# Patient Record
Sex: MALE | Race: OTHER | Employment: FULL TIME | URBAN - METROPOLITAN AREA
[De-identification: names, ages, dates, MRNs, and addresses within clinical notes are randomized per-mention and may not be internally consistent; named-entity substitution may affect disease eponyms.]

---

## 2025-04-23 ENCOUNTER — TELEPHONE (OUTPATIENT)
Dept: CARDIOLOGY | Facility: HOSPITAL | Age: 40
End: 2025-04-23
Payer: COMMERCIAL

## 2025-04-23 NOTE — TELEPHONE ENCOUNTER
PT called to schedule NPV with Dr. Portillo - pt states Dr. Hawkins with [facility] referring to estab w/ cardiologist. PT agreeable to appt with Dr. Portillo 6/12 2:20pm [facility]. Dr. Hawkins's office faxing referral to us today.

## 2025-05-06 PROBLEM — R07.9 CHEST PAIN: Status: ACTIVE | Noted: 2025-05-06

## 2025-05-07 ENCOUNTER — OFFICE VISIT (OUTPATIENT)
Dept: CARDIOLOGY | Facility: CLINIC | Age: 40
End: 2025-05-07
Payer: COMMERCIAL

## 2025-05-07 VITALS
BODY MASS INDEX: 29.71 KG/M2 | WEIGHT: 174 LBS | DIASTOLIC BLOOD PRESSURE: 66 MMHG | HEART RATE: 71 BPM | HEIGHT: 64 IN | SYSTOLIC BLOOD PRESSURE: 112 MMHG

## 2025-05-07 DIAGNOSIS — R07.9 CHEST PAIN, UNSPECIFIED TYPE: Primary | ICD-10-CM

## 2025-05-07 PROCEDURE — 99202 OFFICE O/P NEW SF 15 MIN: CPT

## 2025-05-07 PROCEDURE — 1036F TOBACCO NON-USER: CPT | Performed by: INTERNAL MEDICINE

## 2025-05-07 PROCEDURE — 99202 OFFICE O/P NEW SF 15 MIN: CPT | Performed by: INTERNAL MEDICINE

## 2025-05-07 PROCEDURE — 3008F BODY MASS INDEX DOCD: CPT | Performed by: INTERNAL MEDICINE

## 2025-05-07 PROCEDURE — 99204 OFFICE O/P NEW MOD 45 MIN: CPT | Performed by: INTERNAL MEDICINE

## 2025-05-07 RX ORDER — BUSPIRONE HYDROCHLORIDE 5 MG/1
TABLET ORAL
COMMUNITY
Start: 2025-05-01

## 2025-05-07 RX ORDER — BUPROPION HYDROCHLORIDE 300 MG/1
TABLET ORAL
COMMUNITY
Start: 2025-05-01

## 2025-05-07 RX ORDER — FLUTICASONE PROPIONATE 50 MCG
SPRAY, SUSPENSION (ML) NASAL
COMMUNITY
Start: 2024-12-04

## 2025-05-07 RX ORDER — LORATADINE 10 MG/1
10 TABLET ORAL DAILY
COMMUNITY

## 2025-05-07 RX ORDER — ASPIRIN 81 MG/1
TABLET ORAL
COMMUNITY
Start: 2024-09-19

## 2025-05-07 NOTE — PROGRESS NOTES
"Chief Complaint:   Chest Pain     History of Present Illness     Nick Shelley \"Abbie" is a 39 y.o. male presenting with chest pain for cardiac consultation.  The patient complains that for the past month (1) , they have experienced  non-radiating precordial chest pressure that lasted 5 seconds episodes.  The discomfort is exacerbated by none and relieved by Buspar.  The discomfort is getting better.  The patient does not experience associated shortness of breath, nausea, vomiting or diaphoresis.  The pain is not positional and is not reproduced by palpation.  The patient has no history of known coronary artery disease.  The patient has not had a stress test within the last 12 months and has never had cardiac catheterization.  The patient exercises and does not experience chest discomfort with exertion.  There is no history of aortic aneurysm or thromboembolism.  The patient denies any systemic complaints including fever.  Had palpitations x 6 months - skipped beat and has relieved with Buspar. No risk factors for CAD.  Adopted. Non-smoker.  No HX HTN, DM, HPL.    Review of Systems  All pertinent systems have been reviewed and are negative except for what is stated in the history of present illness.    All other systems have been reviewed and are negative and noncontributory to this patient's current ailments.   .       Previous History     Past Medical History:  He has a past medical history of Chest pain (05/06/2025) and Unspecified injury of right lower leg, initial encounter.    Past Surgical History:  He has a past surgical history that includes Other surgical history (02/08/2021) and Other surgical history (02/08/2021).      Social History:  He reports that he has never smoked. He has never used smokeless tobacco. No history on file for alcohol use and drug use.    Family History:  Family History[1]     Allergies:  Shellfish containing products    Outpatient Medications:  Current Outpatient Medications " "  Medication Instructions    aspirin 81 mg tablet     buPROPion XL (Wellbutrin XL) 300 mg 24 hr tablet     busPIRone (Buspar) 5 mg tablet     fluticasone (Flonase Allergy Relief) 50 mcg/actuation nasal spray     loratadine (CLARITIN) 10 mg, oral, Daily       Physical Examination   Vitals:  Visit Vitals  /66 (BP Location: Right arm, Patient Position: Sitting, BP Cuff Size: Adult)   Pulse 71   Ht 1.626 m (5' 4\")   Wt 78.9 kg (174 lb)   BMI 29.87 kg/m²   Smoking Status Never   BSA 1.89 m²    Physical Exam  Vitals reviewed.   Constitutional:       General: He is not in acute distress.     Appearance: Normal appearance.   HENT:      Head: Normocephalic and atraumatic.      Nose: Nose normal.   Eyes:      Conjunctiva/sclera: Conjunctivae normal.   Cardiovascular:      Rate and Rhythm: Normal rate and regular rhythm.      Pulses: Normal pulses.      Heart sounds: No murmur heard.  Pulmonary:      Effort: Pulmonary effort is normal. No respiratory distress.      Breath sounds: Normal breath sounds. No wheezing, rhonchi or rales.   Abdominal:      General: Bowel sounds are normal. There is no distension.      Palpations: Abdomen is soft.      Tenderness: There is no abdominal tenderness.   Musculoskeletal:         General: No swelling.      Right lower leg: No edema.      Left lower leg: No edema.   Skin:     General: Skin is warm and dry.      Capillary Refill: Capillary refill takes less than 2 seconds.   Neurological:      General: No focal deficit present.      Mental Status: He is alert.   Psychiatric:         Mood and Affect: Mood normal.             Labs/Imaging/Cardiac Studies   I have personally reviewed the patient's available lab work, primary care appointment notes, pertinent imaging studies, and cardiac studies and have discussed them and my independent interpretation of those results with the patient and caregiver at this appointment.  All pertinent recent Emergency Department evaluations and Hospital " admissions were also reviewed in detail with the patient and caregiver.    Reviewed ECG and PCP notes    Echo:  No echocardiogram results found for the past 12 months       Assessment and Recommendations     Assessment/Plan       1. Chest pain, unspecified type (Primary)  The patient presents with atypical chest pain. The ECG is non-ischemic.  The differential diagnosis includes CAD, gastrointestinal, pulmonary, and musculoskeletal etiologies.  There is no clinical evidence to suggest acute coronary syndrome, aortic dissection, or pulmonary embolism.  The ECG shows no evidence of ischemia.  An outpatient evaluation of the patient's chest pain is appropriate with a stress test which will be scheduled as soon as can be arranged. For severe and/or prolonged chest pain, the patient was instructed to call 911.     - Echocardiogram Stress Test; Future           Francisco Goldberg MD    Exclusive of any other services or procedures performed, I, Francisco Goldberg MD , spent 30 minutes in duration for this visit today.  This time consisted of chart review, obtaining history, and/or performing the exam as documented above as well as documenting the clinical information for the encounter in the electronic record, discussing treatment options, plans, and/or goals with patient, family, and/or caregiver, refilling medications, updating the electronic record, ordering medicines, lab work, imaging, referrals, and/or procedures as documented above and communicating with other [facility] professionals. I have discussed the results of laboratory, radiology, and cardiology studies with the patient and their family/caregiver.           [1]   Family History  Adopted: Yes

## 2025-05-15 ENCOUNTER — TELEPHONE (OUTPATIENT)
Dept: CARDIOLOGY | Facility: CLINIC | Age: 40
End: 2025-05-15
Payer: COMMERCIAL

## 2025-05-20 ENCOUNTER — HOSPITAL ENCOUNTER (OUTPATIENT)
Dept: CARDIOLOGY | Facility: CLINIC | Age: 40
Discharge: HOME | End: 2025-05-20
Payer: COMMERCIAL

## 2025-05-20 ENCOUNTER — APPOINTMENT (OUTPATIENT)
Dept: CARDIOLOGY | Facility: CLINIC | Age: 40
End: 2025-05-20
Payer: COMMERCIAL

## 2025-05-20 VITALS
SYSTOLIC BLOOD PRESSURE: 142 MMHG | HEIGHT: 64 IN | BODY MASS INDEX: 29.71 KG/M2 | DIASTOLIC BLOOD PRESSURE: 83 MMHG | HEART RATE: 73 BPM | WEIGHT: 174 LBS

## 2025-05-20 VITALS
SYSTOLIC BLOOD PRESSURE: 142 MMHG | BODY MASS INDEX: 29.71 KG/M2 | HEART RATE: 73 BPM | HEIGHT: 64 IN | WEIGHT: 174 LBS | DIASTOLIC BLOOD PRESSURE: 82 MMHG

## 2025-05-20 DIAGNOSIS — R07.9 CHEST PAIN, UNSPECIFIED TYPE: ICD-10-CM

## 2025-05-20 DIAGNOSIS — R07.9 CHEST PAIN, UNSPECIFIED TYPE: Primary | ICD-10-CM

## 2025-05-20 DIAGNOSIS — I42.1 OBSTRUCTIVE HYPERTROPHIC CARDIOMYOPATHY (MULTI): ICD-10-CM

## 2025-05-20 PROCEDURE — 93017 CV STRESS TEST TRACING ONLY: CPT

## 2025-05-20 PROCEDURE — 99212 OFFICE O/P EST SF 10 MIN: CPT | Mod: 25

## 2025-05-20 PROCEDURE — 99214 OFFICE O/P EST MOD 30 MIN: CPT | Performed by: INTERNAL MEDICINE

## 2025-05-20 PROCEDURE — 99212 OFFICE O/P EST SF 10 MIN: CPT | Performed by: INTERNAL MEDICINE

## 2025-05-20 PROCEDURE — 93350 STRESS TTE ONLY: CPT | Performed by: INTERNAL MEDICINE

## 2025-05-20 PROCEDURE — 93018 CV STRESS TEST I&R ONLY: CPT | Performed by: INTERNAL MEDICINE

## 2025-05-20 PROCEDURE — 1036F TOBACCO NON-USER: CPT | Performed by: INTERNAL MEDICINE

## 2025-05-20 PROCEDURE — 3008F BODY MASS INDEX DOCD: CPT | Performed by: INTERNAL MEDICINE

## 2025-05-20 PROCEDURE — 93016 CV STRESS TEST SUPVJ ONLY: CPT | Performed by: INTERNAL MEDICINE

## 2025-05-20 NOTE — PROGRESS NOTES
"Chief Complaint:   Chest Pain     History of Present Illness     Nick Shelley \"Abbie" is a 39 y.o. male presenting with chest pain for cardiac consultation.  The patient complains that for the past month (1) , they have experienced  non-radiating precordial chest pressure that lasted 5 seconds episodes.  The discomfort is exacerbated by none and relieved by Buspar.  The discomfort is getting better.  The patient does not experience associated shortness of breath, nausea, vomiting or diaphoresis.  The pain is not positional and is not reproduced by palpation.  The patient has no history of known coronary artery disease.  The patient has not had a stress test within the last 12 months and has never had cardiac catheterization.  The patient exercises and does not experience chest discomfort with exertion.  There is no history of aortic aneurysm or thromboembolism.  The patient denies any systemic complaints including fever.  Had palpitations x 6 months - skipped beat and has relieved with Buspar. No risk factors for CAD.  Adopted. Non-smoker.  No HX HTN, DM, HPL.  Since here 5/7/25, no further CP and exercising.    Review of Systems  All pertinent systems have been reviewed and are negative except for what is stated in the history of present illness.    All other systems have been reviewed and are negative and noncontributory to this patient's current ailments.   .       Previous History     Past Medical History:  He has a past medical history of Chest pain (05/06/2025) and Unspecified injury of right lower leg, initial encounter.    Past Surgical History:  He has a past surgical history that includes Other surgical history (02/08/2021) and Other surgical history (02/08/2021).      Social History:  He reports that he has never smoked. He has never used smokeless tobacco. No history on file for alcohol use and drug use.    Family History:  Family History[1]     Allergies:  Shellfish containing products    Outpatient " "Medications:  Current Outpatient Medications   Medication Instructions    aspirin 81 mg tablet     buPROPion XL (Wellbutrin XL) 300 mg 24 hr tablet     busPIRone (Buspar) 5 mg tablet     fluticasone (Flonase Allergy Relief) 50 mcg/actuation nasal spray     loratadine (CLARITIN) 10 mg, Daily       Physical Examination   Vitals:  Visit Vitals  /82 (BP Location: Right arm)   Pulse 73   Ht 1.626 m (5' 4\")   Wt 78.9 kg (174 lb)   BMI 29.87 kg/m²   Smoking Status Never   BSA 1.89 m²    Physical Exam  Vitals reviewed.   Constitutional:       General: He is not in acute distress.     Appearance: Normal appearance.   HENT:      Head: Normocephalic and atraumatic.      Nose: Nose normal.   Eyes:      Conjunctiva/sclera: Conjunctivae normal.   Cardiovascular:      Rate and Rhythm: Normal rate and regular rhythm.      Pulses: Normal pulses.      Heart sounds: No murmur heard.  Pulmonary:      Effort: Pulmonary effort is normal. No respiratory distress.      Breath sounds: Normal breath sounds. No wheezing, rhonchi or rales.   Abdominal:      General: Bowel sounds are normal. There is no distension.      Palpations: Abdomen is soft.      Tenderness: There is no abdominal tenderness.   Musculoskeletal:         General: No swelling.      Right lower leg: No edema.      Left lower leg: No edema.   Skin:     General: Skin is warm and dry.      Capillary Refill: Capillary refill takes less than 2 seconds.   Neurological:      General: No focal deficit present.      Mental Status: He is alert.   Psychiatric:         Mood and Affect: Mood normal.             Labs/Imaging/Cardiac Studies   I have personally reviewed the patient's available lab work, primary care appointment notes, pertinent imaging studies, and cardiac studies and have discussed them and my independent interpretation of those results with the patient and caregiver at this appointment.  All pertinent recent Emergency Department evaluations and Hospital admissions " were also reviewed in detail with the patient and caregiver.    Reviewed Stress echo - no ischemia but septal hypertrophy and moderate mid-cavity obstruction with exercise    Echo:  No echocardiogram results found for the past 12 months       Assessment and Recommendations     Assessment/Plan       1. Chest pain, unspecified type (Primary)  The patient presents with atypical chest pain which has resolved and a normal stress echo.  The chest pain is likely non-cardiac.   2. Hypertrophic cardiomyopathy  Not clear this was the cause of his CP and may be an incidental finding.  No dyspnea, palpitations or syncope.  Adopted so FH unknown.  Cardiac MRI.           Francisco Goldberg MD    Exclusive of any other services or procedures performed, I, Francisco Goldberg MD , spent 30 minutes in duration for this visit today.  This time consisted of chart review, obtaining history, and/or performing the exam as documented above as well as documenting the clinical information for the encounter in the electronic record, discussing treatment options, plans, and/or goals with patient, family, and/or caregiver, refilling medications, updating the electronic record, ordering medicines, lab work, imaging, referrals, and/or procedures as documented above and communicating with other [facility] professionals. I have discussed the results of laboratory, radiology, and cardiology studies with the patient and their family/caregiver.           [1]   Family History  Adopted: Yes

## 2025-06-06 ENCOUNTER — HOSPITAL ENCOUNTER (OUTPATIENT)
Dept: RADIOLOGY | Facility: HOSPITAL | Age: 40
Discharge: HOME | End: 2025-06-06
Payer: COMMERCIAL

## 2025-06-06 VITALS — WEIGHT: 171.96 LBS | BODY MASS INDEX: 29.36 KG/M2 | HEIGHT: 64 IN

## 2025-06-06 DIAGNOSIS — I42.1 OBSTRUCTIVE HYPERTROPHIC CARDIOMYOPATHY (MULTI): ICD-10-CM

## 2025-06-06 DIAGNOSIS — R07.9 CHEST PAIN, UNSPECIFIED TYPE: ICD-10-CM

## 2025-06-06 PROCEDURE — 2550000001 HC RX 255 CONTRASTS: Performed by: INTERNAL MEDICINE

## 2025-06-06 PROCEDURE — A9575 INJ GADOTERATE MEGLUMI 0.1ML: HCPCS | Performed by: INTERNAL MEDICINE

## 2025-06-06 PROCEDURE — 75561 CARDIAC MRI FOR MORPH W/DYE: CPT

## 2025-06-06 RX ORDER — GADOTERATE MEGLUMINE 376.9 MG/ML
31 INJECTION INTRAVENOUS
Status: COMPLETED | OUTPATIENT
Start: 2025-06-06 | End: 2025-06-06

## 2025-06-06 RX ADMIN — GADOTERATE MEGLUMINE 31 ML: 376.9 INJECTION INTRAVENOUS at 14:43

## 2025-06-09 ENCOUNTER — TELEPHONE (OUTPATIENT)
Dept: CARDIOLOGY | Facility: CLINIC | Age: 40
End: 2025-06-09
Payer: COMMERCIAL

## 2025-06-09 DIAGNOSIS — I42.1 OBSTRUCTIVE HYPERTROPHIC CARDIOMYOPATHY (MULTI): Primary | ICD-10-CM

## 2025-06-09 NOTE — TELEPHONE ENCOUNTER
----- Message from Francisco Goldberg sent at 6/9/2025  7:36 AM EDT -----  Cardiac MRI - mild septal hypertrophy.  Consult Dr. Cody for his abnormal stress echo suggesting mild hypertrophic cardiomypathy  ----- Message -----  From: Interface, Radiology Results In  Sent: 6/6/2025   3:17 PM EDT  To: Francisco Goldberg MD

## 2025-06-12 ENCOUNTER — APPOINTMENT (OUTPATIENT)
Dept: CARDIOLOGY | Facility: HOSPITAL | Age: 40
End: 2025-06-12
Payer: COMMERCIAL

## 2025-06-23 ENCOUNTER — TELEPHONE (OUTPATIENT)
Dept: CARDIOLOGY | Facility: CLINIC | Age: 40
End: 2025-06-23
Payer: COMMERCIAL

## 2025-06-25 ENCOUNTER — TELEPHONE (OUTPATIENT)
Dept: CARDIOLOGY | Facility: HOSPITAL | Age: 40
End: 2025-06-25
Payer: COMMERCIAL

## 2025-06-25 DIAGNOSIS — I42.1 OBSTRUCTIVE HYPERTROPHIC CARDIOMYOPATHY (MULTI): Primary | ICD-10-CM

## 2025-07-14 ENCOUNTER — TELEPHONE (OUTPATIENT)
Dept: CARDIOLOGY | Facility: HOSPITAL | Age: 40
End: 2025-07-14
Payer: COMMERCIAL

## 2025-07-14 NOTE — TELEPHONE ENCOUNTER
Left a message for patient about 7-15-25 echo at [facility] and 7-17-25 appointment with Dr. Morse at [facility]

## 2025-07-15 ENCOUNTER — HOSPITAL ENCOUNTER (OUTPATIENT)
Dept: CARDIOLOGY | Facility: HOSPITAL | Age: 40
Discharge: HOME | End: 2025-07-15
Payer: COMMERCIAL

## 2025-07-15 DIAGNOSIS — I42.1 OBSTRUCTIVE HYPERTROPHIC CARDIOMYOPATHY (MULTI): ICD-10-CM

## 2025-07-15 PROCEDURE — 2500000004 HC RX 250 GENERAL PHARMACY W/ HCPCS (ALT 636 FOR OP/ED)

## 2025-07-15 PROCEDURE — 93306 TTE W/DOPPLER COMPLETE: CPT | Performed by: STUDENT IN AN ORGANIZED HEALTH CARE EDUCATION/TRAINING PROGRAM

## 2025-07-15 PROCEDURE — 93306 TTE W/DOPPLER COMPLETE: CPT

## 2025-07-15 RX ADMIN — HUMAN ALBUMIN MICROSPHERES AND PERFLUTREN 3 ML: 10; .22 INJECTION, SOLUTION INTRAVENOUS at 11:50

## 2025-07-16 PROBLEM — S83.249A MEDIAL MENISCUS TEAR: Status: ACTIVE | Noted: 2017-10-23

## 2025-07-16 LAB
AORTIC VALVE MEAN GRADIENT: 6 MMHG
AORTIC VALVE PEAK VELOCITY: 1.56 M/S
AV PEAK GRADIENT: 10 MMHG
AVA (PEAK VEL): 2.62 CM2
AVA (VTI): 2.69 CM2
EJECTION FRACTION APICAL 4 CHAMBER: 66.8
EJECTION FRACTION: 69 %
LEFT ATRIUM VOLUME AREA LENGTH INDEX BSA: 30 ML/M2
LEFT VENTRICLE INTERNAL DIMENSION DIASTOLE: 4.65 CM (ref 3.5–6)
LEFT VENTRICULAR OUTFLOW TRACT DIAMETER: 1.97 CM
MITRAL VALVE E/A RATIO: 0.84
RIGHT VENTRICLE FREE WALL PEAK S': 9 CM/S
RIGHT VENTRICLE PEAK SYSTOLIC PRESSURE: 29 MMHG
TRICUSPID ANNULAR PLANE SYSTOLIC EXCURSION: 1.7 CM

## 2025-07-16 NOTE — PROGRESS NOTES
[facility]   Hypertrophic Cardiomyopathy Center    Primary Care Physician: Renetta Diaz MD  Primary Cardiologist:  Dr. Francisco Goldberg  Date of Visit: 2025  1:40 PM EDT     HPI / Summary:   Nick Sehlley is a 39 y.o. male who is referred for evaluation of possible HCM.     He initially saw Dr. Goldbreg for chest pain. The chest pain was random and not associated with exercise. Not typical for angina but he did notice a number of irregular heart beats. An echocardiogram was suggestive of HCM and thus a CMR was done. CMR suggested maximal wall thickness of 1.3 cm (as did the echo). The pressure in his chest is gone and palpitations are not there anymore. Doesn't have a diagnosis of HTN, occasionally has BP in the 140s.     He was adopted and does not know anything about his family history aside from that his birth mother  in her 30s. One of his cats has HCM. He has never had syncope. He does not have siblings or children.     ROS: Relevant review of symptoms of negative unless discussed above.     Problems:   Problem List[1]    Medical History:   Medical History[2]    Surgical Hx:   Surgical History[3]     Family Hx:   Family History[4]     Social Hx:  Fun: golf, video games  Occupation/School: sociology/ at [employer]  Current Outpatient Medications   Medication Instructions    aspirin 81 mg tablet     buPROPion XL (Wellbutrin XL) 300 mg 24 hr tablet Take 1 tablet (300 mg) by mouth once daily in the morning.    buPROPion XL (WELLBUTRIN XL) 150 mg, oral, Every morning    busPIRone (Buspar) 5 mg tablet Take 1 tablet (5 mg) by mouth 2 times a day.    fluticasone (Flonase Allergy Relief) 50 mcg/actuation nasal spray     loratadine (CLARITIN) 10 mg, As needed       Allergies  Shellfish containing products    Exam:   Vitals: /82 (BP Location: Right arm, Patient Position: Sitting, BP Cuff Size: Large adult)   Pulse 79    "Ht 1.626 m (5' 4\")   Wt 78.5 kg (173 lb)   SpO2 96%   BMI 29.70 kg/m²   Wt Readings from Last 5 Encounters:   07/17/25 78.5 kg (173 lb)   06/06/25 78 kg (171 lb 15.3 oz)   05/20/25 78.9 kg (174 lb)   05/20/25 78.9 kg (174 lb)   05/07/25 78.9 kg (174 lb)     GEN: Pleasant, well-appearing, no acute distress.  HEENT: JVP not elevated  CHEST: Clear to auscultation, No wheeze, good air movement.  CV: normal rate, regular rhythm, no murmurs at rest, with valsalva or with squat to stand.   EXT: Warm, well perfused, No LE edema.   NEURO: grossly non focal  SKIN: No obvious rashes     Labs:   Lipids  Lab Results   Component Value Date    CHOL 181 11/10/2021     Lab Results   Component Value Date    HDL 48.7 11/10/2021     No results found for: \"LDLCALC\"  Lab Results   Component Value Date    TRIG 112 11/10/2021     No components found for: \"CHOLHDL\"    Hemoglobin A1C  Lab Results   Component Value Date    HGBA1C 4.8 11/10/2021       BMP  Lab Results   Component Value Date    GLUCOSE 93 10/13/2021    CALCIUM 9.9 10/13/2021     10/13/2021    K 4.1 10/13/2021    CO2 29 10/13/2021     10/13/2021    BUN 7 10/13/2021    CREATININE 0.95 10/13/2021         Notable Studies: imaging personally reviewed and summarized by me below  EKG:  -7/17/25: NSR, TWI V3-5    Echo:  -7/15/2025 personal review, normal LVEF 65-70%, no ROLAN or LVOT obstruction, max IVS 1.3 cm in short axis    Cardiac MRI:  - 6/6/2025: LVEF 57%, minimal asymmetric septal hypertrophy of the basal anteroseptal wall measuring up to 1.3 cm, no evidence of ROLAN, no LGE, mild TR, mild MR, no unusual papillary muscle insertion    Stress Test:  - 5/20/2025 stress echocardiogram, stage IV of the Berny protocol 10 minutes 44 seconds, 12.9 METS, peak heart rate 164 bpm which is 90% of age-predicted heart rate, no significant symptoms, no arrhythmias or ischemic changes with exercise.  Resting LVEF 55 to 60% with increase with exercise to 70 to 75%.  Reviewed the 44 " mmHg of mid cavitary obstruction with exercise.      Assessment and Plan  Nick Shelley is a 39 y.o. male who is referred for evaluation of possible HCM.     I reviewed his cardiac MRI, echocardiogram and stress echocardiogram.  His echocardiogram shows a maximal septal wall thickness of 1.3 cm (best seen in the short axis) in the maximal wall thickness seen on his cardiac MRI is 1.3 cm as well.  He has no LGE or other morphologic features suggestive of HCM.  His stress echocardiogram showed a hyperdynamic left ventricle with exercise and the gradient is 44 mmHg and mid cavitary due to the dynamic LV.      At this point, he does not meet criteria for hypertrophic cardiomyopathy by wall thickness measurement but his ECG shows TWI in V3-5 and his birth mother (he is adopted) reportedly  in her 30s, which is suspicious. Aside from his cat who has HCM, he has no known family members with HCM.   -will do genetic testing. If he has a pathogenic mutation, then the classification would change.   -regardless, will do a repeat echocardiogram in one year.     Follow up in one year for HCM eval. Can follow continuously with Dr. Goldberg for general cardiology.     Avery Morse MD  Director, Sports Cardiology  Hy[facility]omy[facility]       Time Spent: I spent 40 minutes reviewing medical testing, talking with CMR specialist, obtaining medical history and counselling and educating on diagnosis and documenting clinical encounter.          [1]   Patient Active Problem List  Diagnosis    Chest pain    Obstructive hypertrophic cardiomyopathy (Multi)    Medial meniscus tear   [2]   Past Medical History:  Diagnosis Date    Abnormal ECG     Arrhythmia     Atrial fibrillation (Multi)     Chest pain 2025    Obstructive hypertrophic cardiomyopathy (Multi) 2025    Unspecified injury of right lower leg, initial encounter     Right knee injury   [3]   Past Surgical History:  Procedure Laterality Date     OTHER SURGICAL HISTORY  02/08/2021    Knee surgery    OTHER SURGICAL HISTORY  02/08/2021    Tonsillectomy   [4]   Family History  Adopted: Yes

## 2025-07-17 ENCOUNTER — TELEPHONE (OUTPATIENT)
Dept: GENETICS | Facility: CLINIC | Age: 40
End: 2025-07-17

## 2025-07-17 ENCOUNTER — OFFICE VISIT (OUTPATIENT)
Dept: CARDIOLOGY | Facility: CLINIC | Age: 40
End: 2025-07-17
Payer: COMMERCIAL

## 2025-07-17 VITALS
HEIGHT: 64 IN | BODY MASS INDEX: 29.53 KG/M2 | OXYGEN SATURATION: 96 % | WEIGHT: 173 LBS | HEART RATE: 79 BPM | DIASTOLIC BLOOD PRESSURE: 82 MMHG | SYSTOLIC BLOOD PRESSURE: 134 MMHG

## 2025-07-17 DIAGNOSIS — I51.7 LVH (LEFT VENTRICULAR HYPERTROPHY): ICD-10-CM

## 2025-07-17 DIAGNOSIS — I42.1 OBSTRUCTIVE HYPERTROPHIC CARDIOMYOPATHY (MULTI): Primary | ICD-10-CM

## 2025-07-17 PROCEDURE — 99212 OFFICE O/P EST SF 10 MIN: CPT

## 2025-07-17 PROCEDURE — 93005 ELECTROCARDIOGRAM TRACING: CPT | Performed by: INTERNAL MEDICINE

## 2025-07-17 PROCEDURE — 3008F BODY MASS INDEX DOCD: CPT | Performed by: INTERNAL MEDICINE

## 2025-07-17 PROCEDURE — 99204 OFFICE O/P NEW MOD 45 MIN: CPT | Performed by: INTERNAL MEDICINE

## 2025-07-17 PROCEDURE — 1036F TOBACCO NON-USER: CPT | Performed by: INTERNAL MEDICINE

## 2025-07-17 RX ORDER — BUPROPION HYDROCHLORIDE 150 MG/1
150 TABLET ORAL EVERY MORNING
COMMUNITY

## 2025-07-17 ASSESSMENT — ENCOUNTER SYMPTOMS
OCCASIONAL FEELINGS OF UNSTEADINESS: 0
DEPRESSION: 0
LOSS OF SENSATION IN FEET: 0

## 2025-07-17 ASSESSMENT — COLUMBIA-SUICIDE SEVERITY RATING SCALE - C-SSRS
2. HAVE YOU ACTUALLY HAD ANY THOUGHTS OF KILLING YOURSELF?: NO
6. HAVE YOU EVER DONE ANYTHING, STARTED TO DO ANYTHING, OR PREPARED TO DO ANYTHING TO END YOUR LIFE?: NO
1. IN THE PAST MONTH, HAVE YOU WISHED YOU WERE DEAD OR WISHED YOU COULD GO TO SLEEP AND NOT WAKE UP?: NO

## 2025-07-17 ASSESSMENT — PATIENT HEALTH QUESTIONNAIRE - PHQ9
2. FEELING DOWN, DEPRESSED OR HOPELESS: NOT AT ALL
1. LITTLE INTEREST OR PLEASURE IN DOING THINGS: NOT AT ALL
SUM OF ALL RESPONSES TO PHQ9 QUESTIONS 1 AND 2: 0

## 2025-07-17 ASSESSMENT — PAIN SCALES - GENERAL: PAINLEVEL_OUTOF10: 0-NO PAIN

## 2025-07-17 NOTE — Clinical Note
Arthur Singh, thanks so much for referring Elías for evaluation.   I reviewed his cardiac MRI, echocardiogram and stress echocardiogram.  His echocardiogram shows a maximal septal wall thickness of 1.3 cm (best seen in the short axis) in the maximal wall thickness seen on his cardiac MRI is 1.3 cm as well.  He has no LGE or other morphologic features suggestive of HCM.  His stress echocardiogram showed a hyperdynamic left ventricle with exercise and the gradient is 44 mmHg and mid cavitary due to the dynamic LV.    At this point, he does not meet criteria for hypertrophic cardiomyopathy by wall thickness measurement but his ECG shows TWI in V3-5 and his birth mother (he is adopted) reportedly  in her 30s, which is suspicious. Aside from his cat who has HCM, he has no known family members with HCM.   Malorie, can we please set him up with genetic testing (can be virtual if) If he has a pathogenic mutation, then the classification would change. Will repeat an echocardiogram in one year, regardless.

## 2025-07-18 LAB
ATRIAL RATE: 79 BPM
P AXIS: 57 DEGREES
P OFFSET: 209 MS
P ONSET: 154 MS
PR INTERVAL: 132 MS
Q ONSET: 220 MS
QRS COUNT: 13 BEATS
QRS DURATION: 92 MS
QT INTERVAL: 358 MS
QTC CALCULATION(BAZETT): 410 MS
QTC FREDERICIA: 392 MS
R AXIS: 54 DEGREES
T AXIS: 27 DEGREES
T OFFSET: 399 MS
VENTRICULAR RATE: 79 BPM

## 2025-09-18 ENCOUNTER — APPOINTMENT (OUTPATIENT)
Dept: GENETICS | Facility: CLINIC | Age: 40
End: 2025-09-18
Payer: COMMERCIAL